# Patient Record
Sex: MALE | Race: WHITE | NOT HISPANIC OR LATINO | Employment: FULL TIME | URBAN - METROPOLITAN AREA
[De-identification: names, ages, dates, MRNs, and addresses within clinical notes are randomized per-mention and may not be internally consistent; named-entity substitution may affect disease eponyms.]

---

## 2020-07-23 PROCEDURE — U0003 INFECTIOUS AGENT DETECTION BY NUCLEIC ACID (DNA OR RNA); SEVERE ACUTE RESPIRATORY SYNDROME CORONAVIRUS 2 (SARS-COV-2) (CORONAVIRUS DISEASE [COVID-19]), AMPLIFIED PROBE TECHNIQUE, MAKING USE OF HIGH THROUGHPUT TECHNOLOGIES AS DESCRIBED BY CMS-2020-01-R: HCPCS | Performed by: FAMILY MEDICINE

## 2020-07-26 ENCOUNTER — TELEPHONE (OUTPATIENT)
Dept: URGENT CARE | Facility: CLINIC | Age: 50
End: 2020-07-26

## 2020-10-16 ENCOUNTER — TRANSCRIBE ORDERS (OUTPATIENT)
Dept: ADMINISTRATIVE | Facility: HOSPITAL | Age: 50
End: 2020-10-16

## 2020-10-16 ENCOUNTER — HOSPITAL ENCOUNTER (OUTPATIENT)
Dept: GENERAL RADIOLOGY | Facility: HOSPITAL | Age: 50
Discharge: HOME | End: 2020-10-16
Admitting: INTERNAL MEDICINE

## 2020-10-16 DIAGNOSIS — R06.02 SHORTNESS OF BREATH: Primary | ICD-10-CM

## 2020-10-16 DIAGNOSIS — R06.02 SHORTNESS OF BREATH: ICD-10-CM

## 2020-10-16 DIAGNOSIS — R06.02 SHORTNESS OF BREATH ON EXERTION: ICD-10-CM

## 2020-10-16 PROCEDURE — 71046 X-RAY EXAM CHEST 2 VIEWS: CPT

## 2021-01-04 NOTE — PROGRESS NOTES
Chief Complaint  No chief complaint on file.    Subjective            Problem List:  1.  Obesity  2.  Family history CAD  3.  Arthroscopic knee surgery  4.  Hypertension  5.  Possible sleep apnea  6.  Previous COVID-19 infection July    Chest x-ray 10/16/2020 showed mild increase in perihilar lung markings which could represent bronchitis    Gregorio Jackson presents to [facility] CARDIOLOGY for   History of Present Illness   Patient is referred Friday Dr. Ellsworth for evaluation of intermittent shortness of breath worse with exertion.  Had COVID in July.  In August started to notice more dyspnea on exertion and fatigue since trying to get back into normal routine in August.   He was found to be hypertensive and was started on lisinopril 20 mg. Most evenings after work systolic -160mg Also referred to the [facility] sleep center for evaluation of sleep apnea.    Also noticing forceful heart beat with activity/exertion.  Not having chest pains with exertion but will get some epigastric pain after eating.  He had a stress test ordered in October but insurance declined.      Objective   Vital Signs:   There were no vitals taken for this visit.    Physical Exam  Constitutional:       Appearance: Normal appearance.   Cardiovascular:      Rate and Rhythm: Normal rate.      Pulses: Normal pulses.      Heart sounds: Normal heart sounds.   Pulmonary:      Effort: Pulmonary effort is normal.   Neurological:      Mental Status: He is alert.        Result Review :            ECG 12 Lead    Date/Time: 1/5/2021 10:08 AM  Performed by: Piter Chinchilla MD  Authorized by: Piter Chinchilla MD   Comparison: not compared with previous ECG   Previous ECG: no previous ECG available  Rhythm: sinus rhythm  Rate: normal  BPM: 71  Q waves: III    QRS axis: normal    Clinical impression: non-specific ECG              Assessment and Plan    Problem List Items Addressed This Visit     None          Follow Up    No follow-ups on file.  Patient was given instructions and counseling regarding his condition or for health maintenance advice. Please see specific information pulled into the AVS if appropriate.     A.P:  1.  HTN  -Patient is currently on lisinopril 40 mg but blood pressure still not at goal  -will add HCTZ 25mg daily    2.  Shortness of breath dyspnea on exertion  -Spirometry showed  FEV1/FVC ratio 79%,  -More noticeable since COVID-19 diagnosis in the summer  -EKG shows borderline criteria for prior inferior infarct but no ST changes  -We will plan to further evaluate etiology of symptoms with exercise stress echocardiogram  -Continue aspirin at this time    We will follow-up in 6 weeks        Answers for HPI/ROS submitted by the patient on 1/3/2021   Hypertension  What is the primary reason for your visit?: High Blood Pressure

## 2021-01-05 ENCOUNTER — CONSULT (OUTPATIENT)
Dept: CARDIOLOGY | Facility: CLINIC | Age: 51
End: 2021-01-05

## 2021-01-05 VITALS
HEIGHT: 72 IN | SYSTOLIC BLOOD PRESSURE: 124 MMHG | BODY MASS INDEX: 36.57 KG/M2 | OXYGEN SATURATION: 96 % | DIASTOLIC BLOOD PRESSURE: 78 MMHG | WEIGHT: 270 LBS | HEART RATE: 80 BPM | TEMPERATURE: 96.9 F

## 2021-01-05 DIAGNOSIS — R06.09 DYSPNEA ON EXERTION: ICD-10-CM

## 2021-01-05 DIAGNOSIS — I10 ESSENTIAL HYPERTENSION: Primary | ICD-10-CM

## 2021-01-05 PROCEDURE — 93000 ELECTROCARDIOGRAM COMPLETE: CPT | Performed by: INTERNAL MEDICINE

## 2021-01-05 PROCEDURE — 99244 OFF/OP CNSLTJ NEW/EST MOD 40: CPT | Performed by: INTERNAL MEDICINE

## 2021-01-05 RX ORDER — DIPHENOXYLATE HYDROCHLORIDE AND ATROPINE SULFATE 2.5; .025 MG/1; MG/1
TABLET ORAL DAILY
COMMUNITY

## 2021-01-05 RX ORDER — LISINOPRIL 40 MG/1
TABLET ORAL DAILY
COMMUNITY
Start: 2020-11-20 | End: 2021-01-05 | Stop reason: SDUPTHER

## 2021-01-05 RX ORDER — HYDROCHLOROTHIAZIDE 25 MG/1
25 TABLET ORAL DAILY
Qty: 30 TABLET | Refills: 11 | Status: SHIPPED | OUTPATIENT
Start: 2021-01-05 | End: 2022-08-25 | Stop reason: SDUPTHER

## 2021-01-05 RX ORDER — ASPIRIN 81 MG/1
81 TABLET ORAL DAILY
COMMUNITY
End: 2021-02-23

## 2021-01-05 RX ORDER — LISINOPRIL 40 MG/1
40 TABLET ORAL DAILY
Qty: 30 TABLET | Refills: 6 | Status: SHIPPED | OUTPATIENT
Start: 2021-01-05 | End: 2022-12-08

## 2021-01-31 ENCOUNTER — APPOINTMENT (OUTPATIENT)
Dept: PREADMISSION TESTING | Facility: HOSPITAL | Age: 51
End: 2021-01-31

## 2021-01-31 LAB — SARS-COV-2 RNA RESP QL NAA+PROBE: NOT DETECTED

## 2021-01-31 PROCEDURE — C9803 HOPD COVID-19 SPEC COLLECT: HCPCS

## 2021-01-31 PROCEDURE — U0004 COV-19 TEST NON-CDC HGH THRU: HCPCS

## 2021-02-02 ENCOUNTER — HOSPITAL ENCOUNTER (OUTPATIENT)
Dept: CARDIOLOGY | Facility: HOSPITAL | Age: 51
Discharge: HOME | End: 2021-02-02
Admitting: INTERNAL MEDICINE

## 2021-02-02 VITALS
SYSTOLIC BLOOD PRESSURE: 128 MMHG | WEIGHT: 270 LBS | HEART RATE: 78 BPM | BODY MASS INDEX: 36.57 KG/M2 | DIASTOLIC BLOOD PRESSURE: 68 MMHG | HEIGHT: 72 IN

## 2021-02-02 DIAGNOSIS — R06.09 DYSPNEA ON EXERTION: ICD-10-CM

## 2021-02-02 PROCEDURE — 93350 STRESS TTE ONLY: CPT

## 2021-02-02 PROCEDURE — 93017 CV STRESS TEST TRACING ONLY: CPT

## 2021-02-02 PROCEDURE — 93320 DOPPLER ECHO COMPLETE: CPT

## 2021-02-02 PROCEDURE — 93018 CV STRESS TEST I&R ONLY: CPT | Performed by: INTERNAL MEDICINE

## 2021-02-02 PROCEDURE — 93352 ADMIN ECG CONTRAST AGENT: CPT | Performed by: INTERNAL MEDICINE

## 2021-02-02 PROCEDURE — 93325 DOPPLER ECHO COLOR FLOW MAPG: CPT

## 2021-02-02 PROCEDURE — 93350 STRESS TTE ONLY: CPT | Performed by: INTERNAL MEDICINE

## 2021-02-02 RX ORDER — ATORVASTATIN CALCIUM 40 MG/1
40 TABLET, FILM COATED ORAL
COMMUNITY
Start: 2020-11-20 | End: 2021-02-23

## 2021-02-09 LAB
BH CV ECHO MEAS - AO ROOT AREA (BSA CORRECTED): 1.7
BH CV ECHO MEAS - AO ROOT AREA: 12.6 CM^2
BH CV ECHO MEAS - AO ROOT DIAM: 4 CM
BH CV ECHO MEAS - ASC AORTA: 3.6 CM
BH CV ECHO MEAS - BSA(HAYCOCK): 2.5 M^2
BH CV ECHO MEAS - BSA: 2.4 M^2
BH CV ECHO MEAS - BZI_BMI: 36.6 KILOGRAMS/M^2
BH CV ECHO MEAS - BZI_METRIC_HEIGHT: 182.9 CM
BH CV ECHO MEAS - BZI_METRIC_WEIGHT: 122.5 KG
BH CV ECHO MEAS - EDV(CUBED): 80.6 ML
BH CV ECHO MEAS - EDV(MOD-SP2): 83 ML
BH CV ECHO MEAS - EDV(MOD-SP4): 85 ML
BH CV ECHO MEAS - EDV(TEICH): 84 ML
BH CV ECHO MEAS - EF(CUBED): 66.2 %
BH CV ECHO MEAS - EF(MOD-BP): 69 %
BH CV ECHO MEAS - EF(MOD-SP2): 74.7 %
BH CV ECHO MEAS - EF(MOD-SP4): 64.7 %
BH CV ECHO MEAS - EF(TEICH): 58 %
BH CV ECHO MEAS - ESV(CUBED): 27.3 ML
BH CV ECHO MEAS - ESV(MOD-SP2): 21 ML
BH CV ECHO MEAS - ESV(MOD-SP4): 30 ML
BH CV ECHO MEAS - ESV(TEICH): 35.3 ML
BH CV ECHO MEAS - FS: 30.3 %
BH CV ECHO MEAS - IVS/LVPW: 0.89
BH CV ECHO MEAS - IVSD: 1.1 CM
BH CV ECHO MEAS - LA DIMENSION: 3.2 CM
BH CV ECHO MEAS - LA/AO: 0.8
BH CV ECHO MEAS - LV DIASTOLIC VOL/BSA (35-75): 35.1 ML/M^2
BH CV ECHO MEAS - LV MASS(C)D: 179.3 GRAMS
BH CV ECHO MEAS - LV MASS(C)DI: 74.1 GRAMS/M^2
BH CV ECHO MEAS - LV SYSTOLIC VOL/BSA (12-30): 12.4 ML/M^2
BH CV ECHO MEAS - LVIDD: 4.3 CM
BH CV ECHO MEAS - LVIDS: 3 CM
BH CV ECHO MEAS - LVLD AP2: 7.3 CM
BH CV ECHO MEAS - LVLD AP4: 7.2 CM
BH CV ECHO MEAS - LVLS AP2: 5.6 CM
BH CV ECHO MEAS - LVLS AP4: 5.9 CM
BH CV ECHO MEAS - LVOT AREA (M): 4.9 CM^2
BH CV ECHO MEAS - LVOT AREA: 4.9 CM^2
BH CV ECHO MEAS - LVOT DIAM: 2.5 CM
BH CV ECHO MEAS - LVPWD: 1.2 CM
BH CV ECHO MEAS - SI(CUBED): 22 ML/M^2
BH CV ECHO MEAS - SI(MOD-SP2): 25.6 ML/M^2
BH CV ECHO MEAS - SI(MOD-SP4): 22.7 ML/M^2
BH CV ECHO MEAS - SI(TEICH): 20.1 ML/M^2
BH CV ECHO MEAS - SV(CUBED): 53.4 ML
BH CV ECHO MEAS - SV(MOD-SP2): 62 ML
BH CV ECHO MEAS - SV(MOD-SP4): 55 ML
BH CV ECHO MEAS - SV(TEICH): 48.7 ML
BH CV STRESS BP STAGE 1: NORMAL
BH CV STRESS BP STAGE 2: NORMAL
BH CV STRESS DURATION MIN STAGE 1: 3
BH CV STRESS DURATION MIN STAGE 2: 3
BH CV STRESS DURATION MIN STAGE 3: 3
BH CV STRESS DURATION MIN STAGE 4: 0
BH CV STRESS DURATION SEC STAGE 1: 0
BH CV STRESS DURATION SEC STAGE 2: 0
BH CV STRESS DURATION SEC STAGE 3: 0
BH CV STRESS DURATION SEC STAGE 4: 36
BH CV STRESS GRADE STAGE 1: 10
BH CV STRESS GRADE STAGE 2: 12
BH CV STRESS GRADE STAGE 3: 14
BH CV STRESS GRADE STAGE 4: 16
BH CV STRESS HR STAGE 1: 123
BH CV STRESS HR STAGE 2: 148
BH CV STRESS HR STAGE 3: 169
BH CV STRESS HR STAGE 4: 171
BH CV STRESS METS STAGE 1: 5
BH CV STRESS METS STAGE 2: 7.5
BH CV STRESS METS STAGE 3: 10
BH CV STRESS METS STAGE 4: 13.5
BH CV STRESS O2 STAGE 1: 97
BH CV STRESS O2 STAGE 2: 97
BH CV STRESS O2 STAGE 3: 97
BH CV STRESS PROTOCOL 1: NORMAL
BH CV STRESS RECOVERY BP: NORMAL MMHG
BH CV STRESS RECOVERY HR: 100 BPM
BH CV STRESS SPEED STAGE 1: 1.7
BH CV STRESS SPEED STAGE 2: 2.5
BH CV STRESS SPEED STAGE 3: 3.4
BH CV STRESS SPEED STAGE 4: 4.2
BH CV STRESS STAGE 1: 1
BH CV STRESS STAGE 2: 2
BH CV STRESS STAGE 3: 3
BH CV STRESS STAGE 4: 4
MAXIMAL PREDICTED HEART RATE: 171 BPM
PERCENT MAX PREDICTED HR: 100 %
STRESS BASELINE BP: NORMAL MMHG
STRESS BASELINE HR: 79 BPM
STRESS O2 SAT REST: 97 %
STRESS PERCENT HR: 118 %
STRESS POST ESTIMATED WORKLOAD: 11.4 METS
STRESS POST EXERCISE DUR MIN: 9 MIN
STRESS POST EXERCISE DUR SEC: 36 SEC
STRESS POST O2 SAT PEAK: 97 %
STRESS POST PEAK BP: NORMAL MMHG
STRESS POST PEAK HR: 171 BPM
STRESS TARGET HR: 145 BPM

## 2021-02-22 ENCOUNTER — CONSULT (OUTPATIENT)
Dept: SLEEP MEDICINE | Facility: HOSPITAL | Age: 51
End: 2021-02-22

## 2021-02-22 VITALS
OXYGEN SATURATION: 96 % | DIASTOLIC BLOOD PRESSURE: 73 MMHG | BODY MASS INDEX: 36.46 KG/M2 | SYSTOLIC BLOOD PRESSURE: 149 MMHG | HEART RATE: 70 BPM | HEIGHT: 72 IN | WEIGHT: 269.2 LBS

## 2021-02-22 DIAGNOSIS — G47.19 EXCESSIVE DAYTIME SLEEPINESS: ICD-10-CM

## 2021-02-22 DIAGNOSIS — R06.83 SNORING: ICD-10-CM

## 2021-02-22 DIAGNOSIS — G47.33 OSA (OBSTRUCTIVE SLEEP APNEA): Primary | ICD-10-CM

## 2021-02-22 PROBLEM — I10 ESSENTIAL HYPERTENSION: Status: ACTIVE | Noted: 2021-02-22

## 2021-02-22 PROCEDURE — 99244 OFF/OP CNSLTJ NEW/EST MOD 40: CPT | Performed by: INTERNAL MEDICINE

## 2021-02-22 NOTE — PROGRESS NOTES
"  Gregorio Jackson is a 50 y.o. male.   Chief Complaint   Patient presents with   • Sleeping Problem       HPI     50 y.o. male seen in consultation at the request of Addie Ellsworth MD for evaluation of the above.     He has a 1-2-year history of diminishing sleep quality and daytime sleepiness.    He currently gets in bed around 8 or 9 PM.  He goes to sleep fairly quickly but then will awaken about 1 in the morning and be awake for 2 or 3 hours.  He says he worries about various things during that time.  He will then go back to sleep for several more hours and he thinks he averages about 7 hours of sleep per night.    Despite this description he says this has actually improved over last year when the original referral was made.  He improved his diet and change his diuretic to the morning and this has helped.    His wife notes loud snoring and has complained about this for many years.  She does not notice any specific apneas.    At first he said he was not somnolent during the day but qualified that by saying that he always \"stays busy\".  When I asked him how he would do if he was not doing something he said that he could fall asleep at any time.    [place] Scale is: 10/24    The patient's relevant past medical, surgical, family, and social history reviewed and updated in Epic as appropriate.    Current medications are:   Current Outpatient Medications:   •  aspirin 81 MG EC tablet, Take 81 mg by mouth Daily., Disp: , Rfl:   •  hydroCHLOROthiazide (HYDRODIURIL) 25 MG tablet, Take 1 tablet by mouth Daily., Disp: 30 tablet, Rfl: 11  •  lisinopril (PRINIVIL,ZESTRIL) 40 MG tablet, Take 1 tablet by mouth Daily., Disp: 30 tablet, Rfl: 6  •  multivitamin (MULTI-VITAMIN DAILY PO), Take  by mouth Daily., Disp: , Rfl:   •  atorvastatin (LIPITOR) 40 MG tablet, Take 40 mg by mouth every night at bedtime., Disp: , Rfl: .    Review of Systems    Review of Systems  ROS documented in patient questionnaire ×14 systems.  Reviewed " "with patient.  Otherwise negative except as noted in HPI.    Physical Exam    Blood pressure 149/73, pulse 70, height 182.9 cm (72\"), weight 122 kg (269 lb 3.2 oz), SpO2 96 %. Body mass index is 36.51 kg/m².    Physical Exam  Vitals signs and nursing note reviewed.   Constitutional:       Appearance: Normal appearance. He is well-developed.   HENT:      Head: Normocephalic and atraumatic.      Nose: Nose normal.      Mouth/Throat:      Mouth: Mucous membranes are moist.      Pharynx: Oropharynx is clear. No oropharyngeal exudate.      Comments: Class IV airway  Eyes:      General: No scleral icterus.     Conjunctiva/sclera: Conjunctivae normal.   Neck:      Thyroid: No thyromegaly.      Trachea: No tracheal deviation.   Cardiovascular:      Rate and Rhythm: Normal rate and regular rhythm.      Heart sounds: No murmur. No friction rub. No gallop.    Pulmonary:      Effort: Pulmonary effort is normal. No respiratory distress.      Breath sounds: No wheezing or rales.   Musculoskeletal: Normal range of motion.         General: No deformity.   Skin:     General: Skin is warm and dry.      Findings: No rash.   Neurological:      Mental Status: He is alert and oriented to person, place, and time.   Psychiatric:         Behavior: Behavior normal.         Thought Content: Thought content normal.         DATA:    Reviewed stress echo from 2/2/21    Reviewed cardiology consultation on 1/5/2021    Reviewed chest x-ray from 10/16/2020    ASSESSMENT:    Problem List Items Addressed This Visit        Pulmonary Problems    Snoring    DIANA (obstructive sleep apnea) - probable - Primary       Other    Adult body mass index 36.0-36.9    Excessive daytime sleepiness          50-year-old male with evidence of obstructive sleep apnea based upon snoring, daytime somnolence, and diminished sleep quality.  He has an elevated BMI putting him further risk.    He also has sleep features that would suggest insomnia.    PLAN:    1. Home sleep " apnea testing was recommended and he was agreeable.  I discussed the diagnostic process as well as treatment options.  2. He was amenable to a trial of CPAP therapy if deemed appropriate after his HSAT.  3. I gave him written instructions for improving sleep hygiene and we discussed this  4. We discussed possible relation between untreated obstructive sleep apnea and hypertension, cardiovascular, and metabolic issues going forward  5. Recommended weight loss  6. If he has continued problems with insomnia despite treatment of sleep apnea, if that is present, could consider CBT in the future.    I have reviewed the results of my evaluation and impression and discussed my recommendations in detail with the patient.    Level of Risk Moderate due to: undiagnosed new problem    Signed by  Dandy Porras MD    February 22, 2021      CC: Addie Ellsworth MD          No ref. provider found

## 2021-02-22 NOTE — PROGRESS NOTES
[facility] Cardiology  Follow Up Visit  Gregorio Jackson  1970    VISIT DATE:  02/23/21    PCP:   Addie Ellsworth MD  [address] [address]          CC:  essential hypertension      Problem List:  1.  Obesity  2.  Family history CAD  3.  Arthroscopic knee surgery  4.  Hypertension  5.  Possible sleep apnea  6.  Previous COVID-19 infection July      Stress echo February 2021  · Patient denied chest pain with exercise  · Expected exercise duration = 10:30 Actual = 9:36 NIMA = ( + 10)  · There were no diagnostic ischemic EKG changes during exercise.  · Left ventricular ejection fraction appears to be 66 - 70%. Left ventricular systolic function is normal.  · Left ventricular wall thickness is consistent with borderline concentric hypertrophy.  · Cardiac valves are structurally and functionally normal  · Segment augmentation had a normal response to stress. Cavity size behaved normally in response to stress  · Normal stress echo with no significant echocardiographic evidence for myocardial ischemia.    History of Present Illness:  Gregorio Jackson  Is a 50 y.o. male with pertinent cardiac history detailed above.  Patient following up for dyspnea on exertion.  He had a stress echo completed that was without abnormalities.  Also has hypertension which is better controlled on the HCTZ added last visit.  He states his dyspnea on exertion is feeling better.  He is trying to eat better and exercise more often.  Saw pulmonary yesterday for possible DIANA and will complete a sleep study.  Otherwise doing well without complaints.  Discussed that treatment of sleep apnea could lead to further improvements of blood pressure and less need for medication      Patient Active Problem List    Diagnosis Date Noted   • Adult body mass index 36.0-36.9 02/22/2021   • Essential hypertension 02/22/2021   • Snoring 02/22/2021   • DIANA (obstructive sleep apnea) - probable 02/22/2021   • Excessive  "daytime sleepiness 02/22/2021       No Known Allergies    Social History     Socioeconomic History   • Marital status:      Spouse name: Not on file   • Number of children: Not on file   • Years of education: Not on file   • Highest education level: Not on file   Tobacco Use   • Smoking status: Never Smoker   • Smokeless tobacco: Never Used   Substance and Sexual Activity   • Alcohol use: Yes     Alcohol/week: 3.0 - 5.0 standard drinks     Types: 3 - 5 Shots of liquor per week     Comment: weekends   • Drug use: Never   • Sexual activity: Defer       Family History   Problem Relation Age of Onset   • Hypertension Father    • Cancer Maternal Grandfather    • Heart disease Paternal Grandfather    • Stroke Paternal Grandfather        Current Medications:    Current Outpatient Medications:   •  hydroCHLOROthiazide (HYDRODIURIL) 25 MG tablet, Take 1 tablet by mouth Daily., Disp: 30 tablet, Rfl: 11  •  lisinopril (PRINIVIL,ZESTRIL) 40 MG tablet, Take 1 tablet by mouth Daily., Disp: 30 tablet, Rfl: 6  •  multivitamin (MULTI-VITAMIN DAILY PO), Take  by mouth Daily., Disp: , Rfl:      Review of Systems   Cardiovascular: Negative for chest pain, claudication, dyspnea on exertion, irregular heartbeat, leg swelling, palpitations and syncope.   Respiratory: Negative for cough and shortness of breath.    All other systems reviewed and are negative.      Vitals:    02/23/21 1404   BP: 112/58   BP Location: Right arm   Patient Position: Sitting   Cuff Size: Adult   Pulse: 82   SpO2: 95%   Weight: 122 kg (269 lb)   Height: 182.9 cm (72\")       Physical Exam  Constitutional:       Appearance: Normal appearance.   HENT:      Head: Normocephalic and atraumatic.   Cardiovascular:      Rate and Rhythm: Normal rate and regular rhythm.   Pulmonary:      Effort: Pulmonary effort is normal.      Breath sounds: Normal breath sounds.   Musculoskeletal:      Right lower leg: No edema.      Left lower leg: No edema.   Skin:     General: " Skin is warm and dry.   Neurological:      General: No focal deficit present.      Mental Status: He is alert.         Diagnostic Data:  Procedures  No results found for: CHLPL, TRIG, HDL, LDLDIRECT  No results found for: GLUCOSE, BUN, CREATININE, NA, K, CL, CO2, CREATININE, BUN  No results found for: HGBA1C  No results found for: WBC, HGB, HCT, PLT    Assessment:   Diagnosis Plan   1. Essential hypertension         Plan:    1.  HTN  -Lisinopril 40 mg, HCTZ 25 mg added last visit  -BP improved  -Patient is trying to make lifestyle changes regarding diet and exercise.  Will reassess of BP meds can be decreased at next follow-up visit     2.  Shortness of breath dyspnea on exertion  -Spirometry showed  FEV1/FVC ratio 79%,  -More noticeable since COVID-19 diagnosis in the summer  -Improved since blood pressure has been improved  -Stress echo without evidence of ischemia or valvular heart disease  -Can discontinue aspirin      Follow-up in summer 2021           Piter Chinchilla MD [facility]

## 2021-02-23 ENCOUNTER — OFFICE VISIT (OUTPATIENT)
Dept: CARDIOLOGY | Facility: CLINIC | Age: 51
End: 2021-02-23

## 2021-02-23 VITALS
BODY MASS INDEX: 36.44 KG/M2 | WEIGHT: 269 LBS | HEART RATE: 82 BPM | OXYGEN SATURATION: 95 % | SYSTOLIC BLOOD PRESSURE: 112 MMHG | HEIGHT: 72 IN | DIASTOLIC BLOOD PRESSURE: 58 MMHG

## 2021-02-23 DIAGNOSIS — I10 ESSENTIAL HYPERTENSION: Primary | ICD-10-CM

## 2021-02-23 PROCEDURE — 99214 OFFICE O/P EST MOD 30 MIN: CPT | Performed by: INTERNAL MEDICINE

## 2021-02-26 ENCOUNTER — HOSPITAL ENCOUNTER (OUTPATIENT)
Dept: SLEEP MEDICINE | Facility: HOSPITAL | Age: 51
Discharge: HOME | End: 2021-02-26
Admitting: INTERNAL MEDICINE

## 2021-02-26 VITALS — HEIGHT: 72 IN | WEIGHT: 269 LBS | BODY MASS INDEX: 36.44 KG/M2

## 2021-02-26 DIAGNOSIS — G47.33 OSA (OBSTRUCTIVE SLEEP APNEA): ICD-10-CM

## 2021-02-26 DIAGNOSIS — G47.19 EXCESSIVE DAYTIME SLEEPINESS: ICD-10-CM

## 2021-02-26 DIAGNOSIS — R06.83 SNORING: ICD-10-CM

## 2021-02-26 PROCEDURE — 95800 SLP STDY UNATTENDED: CPT | Performed by: INTERNAL MEDICINE

## 2021-02-26 PROCEDURE — 95800 SLP STDY UNATTENDED: CPT

## 2021-03-02 DIAGNOSIS — G47.33 OSA (OBSTRUCTIVE SLEEP APNEA): Primary | ICD-10-CM

## 2021-03-02 DIAGNOSIS — I10 ESSENTIAL HYPERTENSION: ICD-10-CM

## 2021-03-02 DIAGNOSIS — R06.83 SNORING: ICD-10-CM

## 2021-03-02 DIAGNOSIS — G47.19 EXCESSIVE DAYTIME SLEEPINESS: ICD-10-CM

## 2021-03-03 NOTE — PROGRESS NOTES
ADVISED PATIENT OF STUDY RESULTS. PATIENT VERBALIZED UNDERSTANDING AND WAS AGREEABLE TO PAP THERAPY. FAXED SETUP

## 2021-04-14 ENCOUNTER — IMMUNIZATION (OUTPATIENT)
Dept: VACCINE CLINIC | Facility: HOSPITAL | Age: 51
End: 2021-04-14

## 2021-04-14 PROCEDURE — 0001A: CPT | Performed by: INTERNAL MEDICINE

## 2021-04-14 PROCEDURE — 91300 HC SARSCOV02 VAC 30MCG/0.3ML IM: CPT | Performed by: INTERNAL MEDICINE

## 2021-05-04 ENCOUNTER — IMMUNIZATION (OUTPATIENT)
Dept: VACCINE CLINIC | Facility: HOSPITAL | Age: 51
End: 2021-05-04

## 2021-05-04 PROCEDURE — 0002A: CPT | Performed by: INTERNAL MEDICINE

## 2021-05-04 PROCEDURE — 91300 HC SARSCOV02 VAC 30MCG/0.3ML IM: CPT | Performed by: INTERNAL MEDICINE

## 2022-07-26 ENCOUNTER — HOSPITAL ENCOUNTER (OUTPATIENT)
Dept: GENERAL RADIOLOGY | Facility: HOSPITAL | Age: 52
Discharge: HOME | End: 2022-07-26
Admitting: INTERNAL MEDICINE

## 2022-07-26 ENCOUNTER — TRANSCRIBE ORDERS (OUTPATIENT)
Dept: ADMINISTRATIVE | Facility: HOSPITAL | Age: 52
End: 2022-07-26

## 2022-07-26 DIAGNOSIS — R10.13 EPIGASTRIC PAIN: Primary | ICD-10-CM

## 2022-07-26 DIAGNOSIS — R10.13 EPIGASTRIC PAIN: ICD-10-CM

## 2022-07-26 PROCEDURE — 71046 X-RAY EXAM CHEST 2 VIEWS: CPT

## 2022-08-25 RX ORDER — HYDROCHLOROTHIAZIDE 25 MG/1
25 TABLET ORAL DAILY
Qty: 30 TABLET | Refills: 0 | Status: SHIPPED | OUTPATIENT
Start: 2022-08-25 | End: 2022-12-08

## 2022-09-28 RX ORDER — HYDROCHLOROTHIAZIDE 25 MG/1
25 TABLET ORAL DAILY
Qty: 30 TABLET | Refills: 0 | OUTPATIENT
Start: 2022-09-28

## 2022-12-08 ENCOUNTER — OFFICE VISIT (OUTPATIENT)
Dept: SLEEP MEDICINE | Facility: HOSPITAL | Age: 52
End: 2022-12-08

## 2022-12-08 VITALS
OXYGEN SATURATION: 96 % | DIASTOLIC BLOOD PRESSURE: 66 MMHG | WEIGHT: 280 LBS | HEIGHT: 72 IN | SYSTOLIC BLOOD PRESSURE: 127 MMHG | BODY MASS INDEX: 37.93 KG/M2 | HEART RATE: 74 BPM

## 2022-12-08 DIAGNOSIS — G47.33 OSA (OBSTRUCTIVE SLEEP APNEA): Primary | ICD-10-CM

## 2022-12-08 PROCEDURE — 99213 OFFICE O/P EST LOW 20 MIN: CPT | Performed by: NURSE PRACTITIONER

## 2022-12-08 RX ORDER — PANTOPRAZOLE SODIUM 20 MG/1
20 TABLET, DELAYED RELEASE ORAL DAILY
COMMUNITY
Start: 2022-12-05

## 2022-12-08 NOTE — PROGRESS NOTES
"Chief Complaint:   Chief Complaint   Patient presents with   • Follow-up       HPI:    Gregorio Jackson is a 52 y.o. male here for follow-up of sleep apnea.  Patient was last seen 2/22/2021 for excessive daytime sleepiness and snoring.  Patient had a sleep study 2/26/2021 that showed mild obstructive sleep apnea and he did initiate CPAP therapy.  Patient states he is doing very well with CPAP.  Patient sleeps 7 to 8 hours nightly and does feel rested upon awakening.  Patient goes to sleep easily and does not get up during the night.  Patient has an [place] score of 2/24.  Patient has no concerns or complaints and wishes to continue therapy.        Current medications are:   Current Outpatient Medications:   •  multivitamin (THERAGRAN) tablet tablet, Take  by mouth Daily., Disp: , Rfl:   •  pantoprazole (PROTONIX) 20 MG EC tablet, Take 20 mg by mouth Daily., Disp: , Rfl: .      The patient's relevant past medical, surgical, family and social history were reviewed and updated in Epic as appropriate.       Review of Systems   Respiratory: Positive for apnea.    Gastrointestinal:        Heartburn   Psychiatric/Behavioral: Positive for sleep disturbance.   All other systems reviewed and are negative.        Objective:    Physical Exam  Constitutional:       Appearance: Normal appearance.   HENT:      Head: Normocephalic and atraumatic.      Mouth/Throat:      Comments: Class 4 airway  Cardiovascular:      Rate and Rhythm: Normal rate and regular rhythm.   Pulmonary:      Effort: Pulmonary effort is normal.      Breath sounds: Normal breath sounds.   Skin:     General: Skin is warm and dry.   Neurological:      Mental Status: He is alert and oriented to person, place, and time.   Psychiatric:         Mood and Affect: Mood normal.         Behavior: Behavior normal.         Thought Content: Thought content normal.         Judgment: Judgment normal.       /66   Pulse 74   Ht 182.9 cm (72\")   Wt 127 kg (280 lb)   " SpO2 96%   BMI 37.97 kg/m²     CPAP Report  77/90 days of use  Greater than 4-hour use 78.9  90% pressure 10.2  AHI of 1.4  Settings 8-18    The patient continues to use and benefit from CPAP therapy.    ASSESSMENT/PLAN    Diagnoses and all orders for this visit:    1. DIANA (obstructive sleep apnea) (Primary)  -     PAP Therapy        1. Counseled patient regarding multimodal approach with healthy nutrition, healthy sleep, regular physical activity, social activities, counseling, and medications. Encouraged to practice lateral sleep position. Avoid alcohol and sedatives close to bedtime.  2.   Refill supplies x1 year.  Return to clinic 1 year sooner symptoms warrant.    I have reviewed the results of my evaluation and impression and discussed my recommendations in detail with the patient.      Signed by  ELSY Gomez    December 8, 2022      CC: Addie Ellsworth MD         No ref. provider found

## 2023-01-16 RX ORDER — HYDROCHLOROTHIAZIDE 25 MG/1
TABLET ORAL
Qty: 90 TABLET | Refills: 0 | OUTPATIENT
Start: 2023-01-16

## 2023-12-15 ENCOUNTER — OFFICE VISIT (OUTPATIENT)
Dept: SLEEP MEDICINE | Facility: CLINIC | Age: 53
End: 2023-12-15
Payer: COMMERCIAL

## 2023-12-15 VITALS
WEIGHT: 278.8 LBS | SYSTOLIC BLOOD PRESSURE: 120 MMHG | DIASTOLIC BLOOD PRESSURE: 68 MMHG | TEMPERATURE: 97.5 F | BODY MASS INDEX: 37.76 KG/M2 | OXYGEN SATURATION: 95 % | HEIGHT: 72 IN | HEART RATE: 78 BPM

## 2023-12-15 DIAGNOSIS — G47.33 OSA (OBSTRUCTIVE SLEEP APNEA): Primary | ICD-10-CM

## 2023-12-15 PROCEDURE — 99213 OFFICE O/P EST LOW 20 MIN: CPT | Performed by: NURSE PRACTITIONER

## 2023-12-15 RX ORDER — LISINOPRIL 2.5 MG/1
2.5 TABLET ORAL DAILY
COMMUNITY

## 2023-12-15 NOTE — PROGRESS NOTES
Chief Complaint:   Chief Complaint   Patient presents with    Sleep Apnea    Follow-up       HPI:    Gregorio Jackson is a 53 y.o. male here for follow-up of sleep apnea.  Patient's last seen 12/8/2022.  Patient continues to do well with CPAP therapy.  Patient is sleeping 7 hours nightly and does feel rested upon awakening.  Patient goes to sleep easily and does get up x 1.  Patient has an [place] score of 8/24.  Patient does well without concerns or complaints and will continue therapy.        Current medications are:   Current Outpatient Medications:     lisinopril (PRINIVIL,ZESTRIL) 2.5 MG tablet, Take 1 tablet by mouth Daily., Disp: , Rfl:     multivitamin (THERAGRAN) tablet tablet, Take  by mouth Daily., Disp: , Rfl:     pantoprazole (PROTONIX) 20 MG EC tablet, Take 20 mg by mouth Daily., Disp: , Rfl: .      The patient's relevant past medical, surgical, family and social history were reviewed and updated in Epic as appropriate.       Review of Systems   Respiratory:  Positive for apnea.    Gastrointestinal:         Heartburn   Psychiatric/Behavioral:  Positive for sleep disturbance.    All other systems reviewed and are negative.        Objective:    Physical Exam  Constitutional:       Appearance: Normal appearance.   HENT:      Head: Normocephalic and atraumatic.      Mouth/Throat:      Comments: Class 4 airway  Cardiovascular:      Rate and Rhythm: Normal rate and regular rhythm.   Pulmonary:      Effort: Pulmonary effort is normal.      Breath sounds: Normal breath sounds.   Skin:     General: Skin is warm and dry.   Neurological:      Mental Status: He is alert and oriented to person, place, and time.   Psychiatric:         Mood and Affect: Mood normal.         Behavior: Behavior normal.         Thought Content: Thought content normal.         Judgment: Judgment normal.       CPAP Report  80/90 days of use  Greater than 4-hour use 81.1  90% pressure 6.9  AHI 1.5    The patient continues to use and  benefit from CPAP therapy.    ASSESSMENT/PLAN    Diagnoses and all orders for this visit:    1. DIANA (obstructive sleep apnea) (Primary)  -     PAP Therapy        Counseled patient regarding multimodal approach with healthy nutrition, healthy sleep, regular physical activity, social activities, counseling, and medications. Encouraged to practice lateral sleep position. Avoid alcohol and sedatives close to bedtime.    Refill supplies x 1 year.  Return to clinic 1 year sooner symptoms warrant.    I have reviewed the results of my evaluation and impression and discussed my recommendations in detail with the patient.      Signed by  June Cat, APRN    December 15, 2023      CC: Addie Ellsworth MD         No ref. provider found

## 2024-02-01 ENCOUNTER — TRANSCRIBE ORDERS (OUTPATIENT)
Dept: CARDIOLOGY | Facility: HOSPITAL | Age: 54
End: 2024-02-01
Payer: COMMERCIAL

## 2024-02-01 ENCOUNTER — HOSPITAL ENCOUNTER (OUTPATIENT)
Dept: CARDIOLOGY | Facility: HOSPITAL | Age: 54
Discharge: HOME | End: 2024-02-01
Payer: COMMERCIAL

## 2024-02-01 ENCOUNTER — TRANSCRIBE ORDERS (OUTPATIENT)
Dept: LAB | Facility: HOSPITAL | Age: 54
End: 2024-02-01
Payer: COMMERCIAL

## 2024-02-01 ENCOUNTER — LAB (OUTPATIENT)
Dept: LAB | Facility: HOSPITAL | Age: 54
End: 2024-02-01
Payer: COMMERCIAL

## 2024-02-01 DIAGNOSIS — K64.1 SECOND DEGREE HEMORRHOIDS: Primary | ICD-10-CM

## 2024-02-01 DIAGNOSIS — K64.1 SECOND DEGREE HEMORRHOIDS: ICD-10-CM

## 2024-02-01 LAB
ANION GAP SERPL CALCULATED.3IONS-SCNC: 13.8 MMOL/L (ref 5–15)
BUN SERPL-MCNC: 18 MG/DL (ref 6–20)
BUN/CREAT SERPL: 15.3 (ref 7–25)
CALCIUM SPEC-SCNC: 9.4 MG/DL (ref 8.6–10.5)
CHLORIDE SERPL-SCNC: 103 MMOL/L (ref 98–107)
CO2 SERPL-SCNC: 23.2 MMOL/L (ref 22–29)
CREAT SERPL-MCNC: 1.18 MG/DL (ref 0.76–1.27)
EGFRCR SERPLBLD CKD-EPI 2021: 74.2 ML/MIN/1.73
GLUCOSE SERPL-MCNC: 87 MG/DL (ref 65–99)
POTASSIUM SERPL-SCNC: 4 MMOL/L (ref 3.5–5.2)
QT INTERVAL: 354 MS
QTC INTERVAL: 423 MS
SODIUM SERPL-SCNC: 140 MMOL/L (ref 136–145)

## 2024-02-01 PROCEDURE — 36415 COLL VENOUS BLD VENIPUNCTURE: CPT

## 2024-02-01 PROCEDURE — 93005 ELECTROCARDIOGRAM TRACING: CPT | Performed by: COLON & RECTAL SURGERY

## 2024-02-01 PROCEDURE — 80048 BASIC METABOLIC PNL TOTAL CA: CPT

## 2024-12-16 ENCOUNTER — OFFICE VISIT (OUTPATIENT)
Dept: SLEEP MEDICINE | Facility: CLINIC | Age: 54
End: 2024-12-16
Payer: COMMERCIAL

## 2024-12-16 VITALS
SYSTOLIC BLOOD PRESSURE: 120 MMHG | OXYGEN SATURATION: 94 % | HEIGHT: 72 IN | BODY MASS INDEX: 39.14 KG/M2 | WEIGHT: 289 LBS | HEART RATE: 79 BPM | DIASTOLIC BLOOD PRESSURE: 74 MMHG | TEMPERATURE: 97.8 F

## 2024-12-16 DIAGNOSIS — G47.33 OSA (OBSTRUCTIVE SLEEP APNEA): Primary | ICD-10-CM

## 2024-12-16 PROCEDURE — 99213 OFFICE O/P EST LOW 20 MIN: CPT | Performed by: NURSE PRACTITIONER

## 2024-12-16 NOTE — PROGRESS NOTES
"Chief Complaint:   Chief Complaint   Patient presents with    Follow-up    Sleep Apnea       HPI:    Gregorio Jackson is a 54 y.o. male here for follow-up of sleep apnea.  Patient was last seen 12/15/2023.  Patient continues to do well with CPAP therapy.  Patient is sleeping 7 hours nightly and does feel rested upon awakening.  Patient goes to sleep easily and does get up x 1.  Patient has an [place] score of 3/24.  Patient does well with standard tubing and nasal mask.  Patient has no concerns or complaints and will continue therapy.        Current medications are:   Current Outpatient Medications:     lisinopril (PRINIVIL,ZESTRIL) 2.5 MG tablet, Take 1 tablet by mouth Daily., Disp: , Rfl:     multivitamin (THERAGRAN) tablet tablet, Take  by mouth Daily., Disp: , Rfl: .      The patient's relevant past medical, surgical, family and social history were reviewed and updated in Epic as appropriate.       Review of Systems   Respiratory:  Positive for apnea.    Psychiatric/Behavioral:  Positive for sleep disturbance.    All other systems reviewed and are negative.        Objective:    Physical Exam  Constitutional:       Appearance: Normal appearance.   HENT:      Head: Normocephalic and atraumatic.      Mouth/Throat:      Comments: Mallampati 4 anatomy  Cardiovascular:      Rate and Rhythm: Normal rate and regular rhythm.   Pulmonary:      Effort: Pulmonary effort is normal.      Breath sounds: Normal breath sounds.   Skin:     General: Skin is warm and dry.   Neurological:      Mental Status: He is alert and oriented to person, place, and time.   Psychiatric:         Mood and Affect: Mood normal.         Behavior: Behavior normal.         Thought Content: Thought content normal.         Judgment: Judgment normal.       /74   Pulse 79   Temp 97.8 °F (36.6 °C)   Ht 182 cm (71.65\")   Wt 131 kg (289 lb)   SpO2 94%   BMI 39.57 kg/m²     CPAP Report  79/90 days of use  Greater than 4-hour use 85.6  90% " pressure 5.5  AHI of 1.3    The patient continues to use and benefit from CPAP therapy.    ASSESSMENT/PLAN    Diagnoses and all orders for this visit:    1. DIANA (obstructive sleep apnea) (Primary)  -     PAP Therapy      Refill supplies x 1 year.  Return to clinic 1 year or sooner if symptoms warrant.      Signed by  June Cat, ELSY    December 16, 2024      CC: Addie Ellsworth MD         No ref. provider found

## 2025-03-05 ENCOUNTER — TRANSCRIBE ORDERS (OUTPATIENT)
Dept: GENERAL RADIOLOGY | Facility: HOSPITAL | Age: 55
End: 2025-03-05
Payer: COMMERCIAL

## 2025-03-05 ENCOUNTER — HOSPITAL ENCOUNTER (OUTPATIENT)
Dept: GENERAL RADIOLOGY | Facility: HOSPITAL | Age: 55
Discharge: HOME | End: 2025-03-05
Admitting: INTERNAL MEDICINE
Payer: COMMERCIAL

## 2025-03-05 DIAGNOSIS — M25.511 PAIN, JOINT, SHOULDER, RIGHT: ICD-10-CM

## 2025-03-05 DIAGNOSIS — M25.511 PAIN, JOINT, SHOULDER, RIGHT: Primary | ICD-10-CM

## 2025-03-05 PROCEDURE — 73030 X-RAY EXAM OF SHOULDER: CPT
